# Patient Record
Sex: FEMALE | Race: OTHER | Employment: UNEMPLOYED | URBAN - METROPOLITAN AREA
[De-identification: names, ages, dates, MRNs, and addresses within clinical notes are randomized per-mention and may not be internally consistent; named-entity substitution may affect disease eponyms.]

---

## 2019-11-14 ENCOUNTER — HOSPITAL ENCOUNTER (EMERGENCY)
Age: 24
Discharge: HOME | End: 2019-11-15
Attending: EMERGENCY MEDICINE
Payer: MEDICAID

## 2019-11-14 ENCOUNTER — APPOINTMENT (OUTPATIENT)
Dept: CT IMAGING | Age: 24
End: 2019-11-14
Attending: EMERGENCY MEDICINE
Payer: MEDICAID

## 2019-11-14 ENCOUNTER — APPOINTMENT (OUTPATIENT)
Dept: ULTRASOUND IMAGING | Age: 24
End: 2019-11-14
Attending: EMERGENCY MEDICINE
Payer: MEDICAID

## 2019-11-14 VITALS
HEART RATE: 65 BPM | TEMPERATURE: 98 F | HEIGHT: 66 IN | WEIGHT: 235 LBS | SYSTOLIC BLOOD PRESSURE: 120 MMHG | DIASTOLIC BLOOD PRESSURE: 77 MMHG | BODY MASS INDEX: 37.77 KG/M2 | RESPIRATION RATE: 18 BRPM | OXYGEN SATURATION: 100 %

## 2019-11-14 DIAGNOSIS — R10.9 ABDOMINAL PAIN, ACUTE: Primary | ICD-10-CM

## 2019-11-14 DIAGNOSIS — K44.9 HIATAL HERNIA: ICD-10-CM

## 2019-11-14 PROCEDURE — 83690 ASSAY OF LIPASE: CPT | Performed by: EMERGENCY MEDICINE

## 2019-11-14 PROCEDURE — 87086 URINE CULTURE/COLONY COUNT: CPT | Performed by: EMERGENCY MEDICINE

## 2019-11-14 PROCEDURE — 96361 HYDRATE IV INFUSION ADD-ON: CPT

## 2019-11-14 PROCEDURE — 83690 ASSAY OF LIPASE: CPT

## 2019-11-14 PROCEDURE — 96375 TX/PRO/DX INJ NEW DRUG ADDON: CPT

## 2019-11-14 PROCEDURE — 74177 CT ABD & PELVIS W/CONTRAST: CPT | Performed by: EMERGENCY MEDICINE

## 2019-11-14 PROCEDURE — 96374 THER/PROPH/DIAG INJ IV PUSH: CPT

## 2019-11-14 PROCEDURE — 99284 EMERGENCY DEPT VISIT MOD MDM: CPT

## 2019-11-14 PROCEDURE — 80053 COMPREHEN METABOLIC PANEL: CPT

## 2019-11-14 PROCEDURE — 85025 COMPLETE CBC W/AUTO DIFF WBC: CPT

## 2019-11-14 PROCEDURE — 87086 URINE CULTURE/COLONY COUNT: CPT

## 2019-11-14 PROCEDURE — 80053 COMPREHEN METABOLIC PANEL: CPT | Performed by: EMERGENCY MEDICINE

## 2019-11-14 PROCEDURE — 81001 URINALYSIS AUTO W/SCOPE: CPT

## 2019-11-14 PROCEDURE — 85025 COMPLETE CBC W/AUTO DIFF WBC: CPT | Performed by: EMERGENCY MEDICINE

## 2019-11-14 PROCEDURE — 81001 URINALYSIS AUTO W/SCOPE: CPT | Performed by: EMERGENCY MEDICINE

## 2019-11-14 RX ORDER — ONDANSETRON 2 MG/ML
4 INJECTION INTRAMUSCULAR; INTRAVENOUS
Status: DISCONTINUED | OUTPATIENT
Start: 2019-11-14 | End: 2019-11-15

## 2019-11-14 RX ORDER — KETOROLAC TROMETHAMINE 30 MG/ML
30 INJECTION, SOLUTION INTRAMUSCULAR; INTRAVENOUS ONCE
Status: COMPLETED | OUTPATIENT
Start: 2019-11-14 | End: 2019-11-14

## 2019-11-15 RX ORDER — OMEPRAZOLE 20 MG/1
20 CAPSULE, DELAYED RELEASE ORAL DAILY
Qty: 30 CAPSULE | Refills: 0 | Status: SHIPPED | OUTPATIENT
Start: 2019-11-15 | End: 2019-12-15

## 2019-11-15 NOTE — ED PROVIDER NOTES
Patient Seen in: THE [facility] Emergency Department In [place]      History   Patient presents with:  Abdomen/Flank Pain (GI/)    Stated Complaint: Abdominal pain    HPI    68-year-old female who is status post gastric sleeve surgery presents to the emerge PANEL (14) - Abnormal; Notable for the following components:       Result Value    AST 13 (*)     A/G Ratio 0.9 (*)     All other components within normal limits   URINALYSIS, ROUTINE - Abnormal; Notable for the following components:    [place] established and labs were drawn. Patient continued to be observed here in the emergency department. Patient remained stable throughout the emergency department observation period.     Findings  of the patient's tests results were discussed with the

## 2019-11-15 NOTE — ED INITIAL ASSESSMENT (HPI)
Pt c/o lower abdominal cramping, states that she is on her period and started with LUQ sharp pain about 3 hours ago that is intermittent.  Pt denies any n/v/d

## 2020-03-03 ENCOUNTER — HOSPITAL ENCOUNTER (EMERGENCY)
Age: 25
Discharge: HOME | End: 2020-03-03
Attending: EMERGENCY MEDICINE
Payer: MEDICAID

## 2020-03-03 VITALS
WEIGHT: 205 LBS | DIASTOLIC BLOOD PRESSURE: 92 MMHG | SYSTOLIC BLOOD PRESSURE: 143 MMHG | HEART RATE: 77 BPM | BODY MASS INDEX: 32.95 KG/M2 | HEIGHT: 66 IN | OXYGEN SATURATION: 100 % | TEMPERATURE: 99 F | RESPIRATION RATE: 16 BRPM

## 2020-03-03 DIAGNOSIS — J06.9 VIRAL URI WITH COUGH: Primary | ICD-10-CM

## 2020-03-03 PROCEDURE — 99283 EMERGENCY DEPT VISIT LOW MDM: CPT

## 2020-03-03 RX ORDER — ALBUTEROL SULFATE 90 UG/1
2 AEROSOL, METERED RESPIRATORY (INHALATION) EVERY 4 HOURS PRN
Qty: 1 INHALER | Refills: 0 | Status: SHIPPED | OUTPATIENT
Start: 2020-03-03 | End: 2020-04-02

## 2020-03-03 RX ORDER — BENZONATATE 100 MG/1
100 CAPSULE ORAL 3 TIMES DAILY PRN
Qty: 30 CAPSULE | Refills: 0 | Status: SHIPPED | OUTPATIENT
Start: 2020-03-03 | End: 2020-04-02

## 2020-03-03 NOTE — ED PROVIDER NOTES
Patient Seen in: THE [facility] Emergency Department In [place]      History   Patient presents with:  Cough/URI    Stated Complaint: cough - congestion     25-year-old female who presents to the emergency room with complaints of cough and congestion for the All other systems reviewed and negative except as noted above.     Physical Exam     ED Triage Vitals [03/03/20 1211]   BP (!) 143/92   Pulse 77   Resp 16   Temp 98.7 °F (37.1 °C)   Temp src Oral   SpO2 100 %   O2 Device None (Room air)       Current:B this will discharge to follow-up with primary care physician in 2-3 days. Supportive care instructions provided. Patient to take over-the-counter and Tylenol and Motrin as needed for fever and pain.   Advised to return to the emergency room if any new or

## 2021-11-23 ENCOUNTER — HOSPITAL ENCOUNTER (EMERGENCY)
Age: 26
Discharge: HOME | End: 2021-11-23
Attending: EMERGENCY MEDICINE
Payer: MEDICAID

## 2021-11-23 VITALS
TEMPERATURE: 99 F | OXYGEN SATURATION: 99 % | DIASTOLIC BLOOD PRESSURE: 70 MMHG | RESPIRATION RATE: 18 BRPM | SYSTOLIC BLOOD PRESSURE: 116 MMHG | WEIGHT: 205 LBS | HEIGHT: 66 IN | BODY MASS INDEX: 32.95 KG/M2 | HEART RATE: 59 BPM

## 2021-11-23 DIAGNOSIS — U07.1 COVID-19: Primary | ICD-10-CM

## 2021-11-23 PROCEDURE — 99284 EMERGENCY DEPT VISIT MOD MDM: CPT

## 2021-11-23 NOTE — ED PROVIDER NOTES
Patient Seen in: THE [facility] Emergency Department In [place]      History   Patient presents with:  Ear Pain    Stated Complaint: left ear pain     Subjective:   70-year-old female presents with left ear pain that started today.   Patient states she has had HENT:      Head: Normocephalic. Right Ear: Hearing, tympanic membrane and ear canal normal.      Left Ear: Tympanic membrane is erythematous. Cardiovascular:      Rate and Rhythm: Normal rate.    Pulmonary:      Effort: Pulmonary effort is normal. guidelines.                  Disposition and Plan     Clinical Impression:  [id number]  (primary encounter diagnosis)     Disposition:  Discharge  11/23/2021  5:11 pm    Follow-up:  Anthony Raza  [address] [facility] [address] [phone]

## 2021-11-23 NOTE — ED PROVIDER NOTES
Case discussed with BREANNA Ball Peers. Patient does have fluid and a pinkish color to the left TM. EAC is clear. Covid test was positive. Consented for polyclonal antibodies. Will discharged home with Covid instructions.   Agree with the treatment plan

## 2022-11-26 ENCOUNTER — APPOINTMENT (OUTPATIENT)
Dept: CT IMAGING | Age: 27
End: 2022-11-26
Attending: EMERGENCY MEDICINE
Payer: MEDICAID

## 2022-11-26 ENCOUNTER — HOSPITAL ENCOUNTER (EMERGENCY)
Age: 27
Discharge: HOME | End: 2022-11-26
Attending: EMERGENCY MEDICINE
Payer: MEDICAID

## 2022-11-26 VITALS
OXYGEN SATURATION: 100 % | DIASTOLIC BLOOD PRESSURE: 70 MMHG | WEIGHT: 200 LBS | SYSTOLIC BLOOD PRESSURE: 130 MMHG | BODY MASS INDEX: 32.14 KG/M2 | TEMPERATURE: 97 F | HEART RATE: 68 BPM | HEIGHT: 66 IN | RESPIRATION RATE: 16 BRPM

## 2022-11-26 DIAGNOSIS — M54.6 ACUTE RIGHT-SIDED THORACIC BACK PAIN: Primary | ICD-10-CM

## 2022-11-26 LAB
ALBUMIN SERPL-MCNC: 3.3 G/DL (ref 3.4–5)
ALBUMIN/GLOB SERPL: 1 {RATIO} (ref 1–2)
ALP LIVER SERPL-CCNC: 35 U/L
ALT SERPL-CCNC: 17 U/L
ANION GAP SERPL CALC-SCNC: 5 MMOL/L (ref 0–18)
AST SERPL-CCNC: 11 U/L (ref 15–37)
B-HCG UR QL: NEGATIVE
BASOPHILS # BLD AUTO: 0.08 X10(3) UL (ref 0–0.2)
BASOPHILS NFR BLD AUTO: 1.1 %
BILIRUB SERPL-MCNC: 0.3 MG/DL (ref 0.1–2)
BILIRUB UR QL STRIP.AUTO: NEGATIVE
BUN BLD-MCNC: 10 MG/DL (ref 7–18)
CALCIUM BLD-MCNC: 8.3 MG/DL (ref 8.5–10.1)
CHLORIDE SERPL-SCNC: 112 MMOL/L (ref 98–112)
CLARITY UR REFRACT.AUTO: CLEAR
CO2 SERPL-SCNC: 24 MMOL/L (ref 21–32)
COLOR UR AUTO: YELLOW
CREAT BLD-MCNC: 0.61 MG/DL
EOSINOPHIL # BLD AUTO: 0.24 X10(3) UL (ref 0–0.7)
EOSINOPHIL NFR BLD AUTO: 3.4 %
ERYTHROCYTE [DISTWIDTH] IN BLOOD BY AUTOMATED COUNT: 17.4 %
GFR SERPLBLD BASED ON 1.73 SQ M-ARVRAT: 125 ML/MIN/1.73M2 (ref 60–?)
GLOBULIN PLAS-MCNC: 3.4 G/DL (ref 2.8–4.4)
GLUCOSE BLD-MCNC: 90 MG/DL (ref 70–99)
GLUCOSE UR STRIP.AUTO-MCNC: NEGATIVE MG/DL
HCT VFR BLD AUTO: 33.1 %
HGB BLD-MCNC: 10.1 G/DL
IMM GRANULOCYTES # BLD AUTO: 0.02 X10(3) UL (ref 0–1)
IMM GRANULOCYTES NFR BLD: 0.3 %
KETONES UR STRIP.AUTO-MCNC: NEGATIVE MG/DL
LEUKOCYTE ESTERASE UR QL STRIP.AUTO: NEGATIVE
LYMPHOCYTES # BLD AUTO: 1.39 X10(3) UL (ref 1–4)
LYMPHOCYTES NFR BLD AUTO: 19.7 %
MCH RBC QN AUTO: 23.4 PG (ref 26–34)
MCHC RBC AUTO-ENTMCNC: 30.5 G/DL (ref 31–37)
MCV RBC AUTO: 76.6 FL
MONOCYTES # BLD AUTO: 0.63 X10(3) UL (ref 0.1–1)
MONOCYTES NFR BLD AUTO: 8.9 %
NEUTROPHILS # BLD AUTO: 4.71 X10 (3) UL (ref 1.5–7.7)
NEUTROPHILS # BLD AUTO: 4.71 X10(3) UL (ref 1.5–7.7)
NEUTROPHILS NFR BLD AUTO: 66.6 %
NITRITE UR QL STRIP.AUTO: NEGATIVE
OSMOLALITY SERPL CALC.SUM OF ELEC: 291 MOSM/KG (ref 275–295)
PH UR STRIP.AUTO: 5 [PH] (ref 5–8)
PLATELET # BLD AUTO: 384 10(3)UL (ref 150–450)
POTASSIUM SERPL-SCNC: 4.1 MMOL/L (ref 3.5–5.1)
PROT SERPL-MCNC: 6.7 G/DL (ref 6.4–8.2)
PROT UR STRIP.AUTO-MCNC: NEGATIVE MG/DL
RBC # BLD AUTO: 4.32 X10(6)UL
SODIUM SERPL-SCNC: 141 MMOL/L (ref 136–145)
SP GR UR STRIP.AUTO: 1.02 (ref 1–1.03)
UROBILINOGEN UR STRIP.AUTO-MCNC: 0.2 MG/DL
WBC # BLD AUTO: 7.1 X10(3) UL (ref 4–11)

## 2022-11-26 PROCEDURE — 96375 TX/PRO/DX INJ NEW DRUG ADDON: CPT

## 2022-11-26 PROCEDURE — 99285 EMERGENCY DEPT VISIT HI MDM: CPT

## 2022-11-26 PROCEDURE — 96374 THER/PROPH/DIAG INJ IV PUSH: CPT

## 2022-11-26 PROCEDURE — 99284 EMERGENCY DEPT VISIT MOD MDM: CPT

## 2022-11-26 PROCEDURE — 74176 CT ABD & PELVIS W/O CONTRAST: CPT | Performed by: EMERGENCY MEDICINE

## 2022-11-26 PROCEDURE — 81015 MICROSCOPIC EXAM OF URINE: CPT

## 2022-11-26 PROCEDURE — 81001 URINALYSIS AUTO W/SCOPE: CPT | Performed by: EMERGENCY MEDICINE

## 2022-11-26 PROCEDURE — 81001 URINALYSIS AUTO W/SCOPE: CPT

## 2022-11-26 PROCEDURE — 81025 URINE PREGNANCY TEST: CPT

## 2022-11-26 PROCEDURE — 85025 COMPLETE CBC W/AUTO DIFF WBC: CPT | Performed by: EMERGENCY MEDICINE

## 2022-11-26 PROCEDURE — 96361 HYDRATE IV INFUSION ADD-ON: CPT

## 2022-11-26 PROCEDURE — 80053 COMPREHEN METABOLIC PANEL: CPT | Performed by: EMERGENCY MEDICINE

## 2022-11-26 RX ORDER — MORPHINE SULFATE 4 MG/ML
4 INJECTION, SOLUTION INTRAMUSCULAR; INTRAVENOUS ONCE
Status: COMPLETED | OUTPATIENT
Start: 2022-11-26 | End: 2022-11-26

## 2022-11-26 RX ORDER — CYCLOBENZAPRINE HCL 10 MG
10 TABLET ORAL 3 TIMES DAILY PRN
Qty: 20 TABLET | Refills: 0 | Status: SHIPPED | OUTPATIENT
Start: 2022-11-26 | End: 2022-12-03

## 2022-11-26 RX ORDER — KETOROLAC TROMETHAMINE 15 MG/ML
15 INJECTION, SOLUTION INTRAMUSCULAR; INTRAVENOUS ONCE
Status: COMPLETED | OUTPATIENT
Start: 2022-11-26 | End: 2022-11-26

## 2022-11-26 RX ORDER — DIAZEPAM 5 MG/1
5 TABLET ORAL ONCE
Status: COMPLETED | OUTPATIENT
Start: 2022-11-26 | End: 2022-11-26

## 2022-11-26 RX ORDER — PREDNISONE 20 MG/1
40 TABLET ORAL DAILY
Qty: 10 TABLET | Refills: 0 | Status: SHIPPED | OUTPATIENT
Start: 2022-11-26 | End: 2022-12-01

## 2023-08-08 ENCOUNTER — HOSPITAL ENCOUNTER (EMERGENCY)
Age: 28
Discharge: LEFT BEFORE DISCHARGE | End: 2023-08-08
Payer: MEDICAID

## 2023-08-08 VITALS
DIASTOLIC BLOOD PRESSURE: 76 MMHG | TEMPERATURE: 98 F | HEART RATE: 65 BPM | RESPIRATION RATE: 18 BRPM | HEIGHT: 66 IN | SYSTOLIC BLOOD PRESSURE: 120 MMHG | WEIGHT: 200 LBS | OXYGEN SATURATION: 100 % | BODY MASS INDEX: 32.14 KG/M2

## 2023-08-09 NOTE — ED INITIAL ASSESSMENT (HPI)
Patient fell on Saturday, c/o pain to tailbone that is getting worse. Last took tylenol this morning.

## 2024-02-13 ENCOUNTER — OFFICE VISIT (OUTPATIENT)
Dept: FAMILY MEDICINE CLINIC | Facility: CLINIC | Age: 29
End: 2024-02-13
Payer: MEDICAID

## 2024-02-13 VITALS
BODY MASS INDEX: 33.75 KG/M2 | HEART RATE: 98 BPM | TEMPERATURE: 99 F | HEIGHT: 66 IN | OXYGEN SATURATION: 100 % | DIASTOLIC BLOOD PRESSURE: 82 MMHG | WEIGHT: 210 LBS | RESPIRATION RATE: 18 BRPM | SYSTOLIC BLOOD PRESSURE: 138 MMHG

## 2024-02-13 DIAGNOSIS — K52.9 GASTROENTERITIS: Primary | ICD-10-CM

## 2024-02-13 PROCEDURE — 99202 OFFICE O/P NEW SF 15 MIN: CPT | Performed by: NURSE PRACTITIONER

## 2024-02-13 RX ORDER — ONDANSETRON 4 MG/1
4 TABLET, FILM COATED ORAL EVERY 8 HOURS PRN
Qty: 10 TABLET | Refills: 0 | Status: SHIPPED | OUTPATIENT
Start: 2024-02-13 | End: 2024-02-16

## 2024-02-13 NOTE — PROGRESS NOTES
CHIEF COMPLAINT:     Chief Complaint   Patient presents with    Flu     X 1 day       HPI:   Dolores Lundberg is a 28 year old female who presents for complaints of nausea, vomiting, diarrhea and stomach cramping.  Symptoms have been present for 1  days.  Frequency: approx 6 times.  Associated symptoms: abdominal cramping, low grade fever, body aches.. Symptoms are worsened by eating. Prior abdomial hx: gastric sleeve.   Appetite is diminished.  Last successful oral intake last night.  Denies moderate to severe abdominal pain.  denies new foods.    Current Outpatient Medications   Medication Sig Dispense Refill    ondansetron (ZOFRAN) 4 mg tablet Take 1 tablet (4 mg total) by mouth every 8 (eight) hours as needed for Nausea. 10 tablet 0      No past medical history on file.   Social History:  Social History     Socioeconomic History    Marital status: Single   Tobacco Use    Smoking status: Former     Packs/day: 0.20     Years: 5.00     Additional pack years: 0.00     Total pack years: 1.00     Types: Cigarettes     Quit date: 2021     Years since quittin.5    Smokeless tobacco: Current   Vaping Use    Vaping Use: Never used   Substance and Sexual Activity    Alcohol use: Not Currently     Comment: social    Drug use: Never        REVIEW OF SYSTEMS:   GENERAL HEALTH: + chills, + fever, + malaise  SKIN: denies any unusual skin lesions or rashes  HEENT: denies ear pain, congestion, or sore throat  CARDIOVASCULAR: denies chest pain or palpitations  RESPIRATORY: denies shortness of breath, cough or wheezing  GI:See HPI  NEURO: + headaches, denies loss of bowel or bladder control    EXAM:   /82   Pulse 98   Temp 99.2 °F (37.3 °C) (Oral)   Resp 18   Ht 5' 6\" (1.676 m)   Wt 210 lb (95.3 kg)   LMP 2024 (Approximate)   SpO2 100%   BMI 33.89 kg/m²   GENERAL: well developed, well nourished,in no apparent distress  SKIN: no rashes,no suspicious lesions  HEAD: atraumatic, normocephalic,   EYES:  conjunctiva clear, EOM intact  EARS: TM's clear, no bulging, erythema or fluid bilaterally  NOSE: nostrils patent. Nasal mucosa pink and without exudates.   THROAT: oral mucosa pink, moist. Posterior pharynx is not erythematous. No exudates.  NECK: supple,no adenopathy  LUNGS: clear to auscultation bilaterally. No wheezing, rales, or rhonchi.    CARDIO: RRR without murmur  GI: No visible scars or masses. BS's present x4.  No palpable masses or hepatosplenomegaly.  + tenderness upon palpation in all quadrants. No rebound tenderness. Negative craig's sign.   EXTREMITIES: no cyanosis, clubbing or edema    ASSESSMENT AND PLAN:   ASSESSMENT:  Encounter Diagnosis   Name Primary?    Gastroenteritis Yes       PLAN: Proper diet discussed.  Zofran prn. To ED for moderate to severe abdominal pain, dehydration, blood in stool, or new onset of fever.      Reassurance given. Education provided. Questions answered.    There are no Patient Instructions on file for this visit.    The patient indicates understanding of these issues and agrees to the plan.  The patient is asked to see PCP if no improvement in 2-3 days.

## (undated) NOTE — LETTER
Date & Time: 3/3/2020, 12:25 PM  Patient: Christina Sanders  Encounter Provider(s):    MD Gera Jade APRN       To Whom It May Concern:    Christina Sanders was seen and treated in our department on 3/3/2020.  She should no

## (undated) NOTE — ED AVS SNAPSHOT
Amor Burrell   MRN: [id number]    Department:  THE [facility] Emergency Department in [place]   Date of Visit:  11/14/2019           Disclosure     Insurance plans vary and the physician(s) referred by the ER may not be covered by your plan.  Please c tell this physician (or your personal doctor if your instructions are to return to your personal doctor) about any new or lasting problems. The primary care or specialist physician will see patients referred from the [facility] Emergency Department.  Jasvir Tapia

## (undated) NOTE — ED AVS SNAPSHOT
Kaliadevontescott Alia   MRN: [id number]    Department:  THE [facility] Emergency Department in [place]   Date of Visit:  3/3/2020           Disclosure     Insurance plans vary and the physician(s) referred by the ER may not be covered by your plan.  Please con tell this physician (or your personal doctor if your instructions are to return to your personal doctor) about any new or lasting problems. The primary care or specialist physician will see patients referred from the [facility] Emergency Department.  Lalo Hall